# Patient Record
(demographics unavailable — no encounter records)

---

## 2024-11-06 NOTE — PLAN
[FreeTextEntry1] :  A/P: 65 year F for [employer] - Pelvic pain, now resolved - No pap due to hx SAMUEL 2/2 fibroids - Rx DEXA - Pt deferred CBE, Mammo/sono 8/2024 wnl per pt ([facility]) - Vaginitis panel - UCx - RTO in 1yr/PRN

## 2024-11-06 NOTE — REASON FOR VISIT
[Initial] : an initial consultation for [Annual] : an annual visit. [FreeTextEntry2] : Pelvic pain x 2 weeks

## 2024-11-06 NOTE — REVIEW OF SYSTEMS
[Patient Intake Form Reviewed] : Patient intake form was reviewed [SOB on Exertion] : shortness of breath on exertion [Chest Pain] : chest pain [Constipation] : constipation [Diarrhea] : diarrhea [Arthralgias] : arthralgias [Negative] : Heme/Lymph

## 2024-11-06 NOTE — PHYSICAL EXAM
[Chaperone Present] : A chaperone was present in the examining room during all aspects of the physical examination [[id number]] : A chaperone was present during the pelvic exam. [Appropriately responsive] : appropriately responsive [Alert] : alert [No Acute Distress] : no acute distress [No Lymphadenopathy] : no lymphadenopathy [Soft] : soft [Non-tender] : non-tender [Non-distended] : non-distended [No HSM] : No HSM [No Lesions] : no lesions [No Mass] : no mass [Oriented x3] : oriented x3 [Labia Majora] : normal [Labia Minora] : normal [Normal] : normal [Atrophy] : atrophy [Absent] : absent [Uterine Adnexae] : non-palpable [FreeTextEntry4] : narrowed introitus

## 2024-11-06 NOTE — HISTORY OF PRESENT ILLNESS
[FreeTextEntry1] :  65 year F presents for annual visit Pelvic pain x 2wks, now resolved. Unsure if it was vaginal pain or somewhere else. c/o chronic constipation  Currently undergoing workup for new cardia issue  LMP: , s/p SAMUEL SA/Contraception: No  OBHx: P2, FAVD x1,  x 1 GYNHx: hx fibroids, SAMUEL, removal of one ovary (pt unsure which one) PMHx: HTN, HLD, OA, DM2 PSHx: SAMUEL/oophorectomy, Hemorrhoidectomy, Rectal fistula repair x 2 Meds: Metoprolol, Metformin, Glimepiride, Jardiance, ozempic, Lipitor, Meclizine, Pantoprazole, Camsyos Allergies: NKDA Soc Hx: Denies toxic habits  Health Maintenance: Last Pap - Over 20yrs ago Mammo/Sono - 2024 DEXA - Never

## 2024-11-06 NOTE — PHYSICAL EXAM
[Chaperone Present] : A chaperone was present in the examining room during all aspects of the physical examination [[id number]] : A chaperone was present during the pelvic exam. [Appropriately responsive] : appropriately responsive [Alert] : alert [No Acute Distress] : no acute distress [No Lymphadenopathy] : no lymphadenopathy [Soft] : soft [Non-tender] : non-tender [Non-distended] : non-distended [No HSM] : No HSM [No Lesions] : no lesions [No Mass] : no mass [Oriented x3] : oriented x3 [Labia Majora] : normal [Labia Minora] : normal [Normal] : normal [Atrophy] : atrophy [Absent] : absent [Uterine Adnexae] : non-palpable [FreeTextEntry4] : narrowed introitus

## 2024-11-06 NOTE — PLAN
[FreeTextEntry1] :  A/P: 65 year F for [employer] - Pelvic pain, now resolved - No pap due to hx SAMUEL 2/2 fibroids - Rx DEXA - Pt deferred CBE, Mammo/sono 8/2024 wnl per pt ([facility]) - Vaginitis panel - UCx - RTO in 1yr/PRN

## 2025-03-12 NOTE — ASSESSMENT
[FreeTextEntry1] : The patient likely had an infected sebaceous cyst or other skin lesion unrelated to her previous fistula.  She is reassured that there is currently no pathology visible and if anything recurs she will follow-up here immediately

## 2025-03-12 NOTE — PHYSICAL EXAM
[Normal Breath Sounds] : Normal breath sounds [Normal Heart Sounds] : normal heart sounds [Normal Rate and Rhythm] : normal rate and rhythm [No Rash or Lesion] : No rash or lesion [Alert] : alert [Oriented to Person] : oriented to person [Oriented to Place] : oriented to place [Oriented to Time] : oriented to time [Calm] : calm [Normal] : was normal [None] : there was no rectal mass  [Excoriation] : no perianal excoriation [Fistula] : no fistulas [Wart] : no warts [Ulcer ___ cm] : no ulcers [de-identified] : Soft/ND [de-identified] : right lateral well healed fistulotomy site, pt points to left anterior position for this recent lump and at that site the skin appears normal  [de-identified] : Anoscopy reveals no proctitis, mildly enlarged internal hemorrhoids, no internal openings [de-identified] : NAD [de-identified] : NC/AT [de-identified] : +ROM/PEARSON [de-identified] : Normal  [de-identified] : Intact

## 2025-03-12 NOTE — CONSULT LETTER
[Dear  ___] : Dear  [unfilled], [Consult Letter:] : I had the pleasure of evaluating your patient, [unfilled]. [Please see my note below.] : Please see my note below. [Sincerely,] : Sincerely, [FreeTextEntry3] : Christiano Flores MD, FACS, FASCRS Colorectal Surgery [facility] Assistant Professor of Surgery [employer] at [address], [address] [place] [address] Tel: [phone]  Cell: [phone]  Email: [email or web address]

## 2025-03-12 NOTE — HISTORY OF PRESENT ILLNESS
[FreeTextEntry1] : 64 y/o female presents here for pimple near rectum. Patient states she had a previous fistula in 1999. Patient states as of 2 months ago she saw one pimple that was painful near her rectum and was nervous her fistula was coming back. She states currently she no longer has pain but would like it checked out.  She states she occasionally has some constipation but goes mostly every 2 days. Patient denies abd pain, nausea/vomiting, diarrhea, rectal bleeding and weight loss.   Referred by MD Dobbins  3-4 years ago, had a CT scan done because she couldn't get a colonoscopy due to scar tissue in her rectum. NL study.

## 2025-03-12 NOTE — REVIEW OF SYSTEMS
[Feeling Tired] : feeling tired [As Noted in HPI] : as noted in HPI [Joint Swelling] : joint swelling [Joint Stiffness] : joint stiffness [Negative] : Heme/Lymph [FreeTextEntry3] : Wears glasses [FreeTextEntry5] : hypertrophic cardiomyopathy